# Patient Record
(demographics unavailable — no encounter records)

---

## 2025-05-07 NOTE — PLAN
[FreeTextEntry1] : Anxiety - well controlled on zoloft 50 mg QD. Renewal sent to pharmacy - advised to follow up in office for CPE and routine labs

## 2025-05-07 NOTE — HISTORY OF PRESENT ILLNESS
[Other Location: e.g. Home (Enter Location, City,State)___] : at [unfilled] [Telehealth (audio & video)] : This visit was provided via telehealth using real-time 2-way audio visual technology. [Verbal consent obtained from patient] : the patient, [unfilled] [Other Location: e.g. School (Enter Location, City,State)___] : at [unfilled], at the time of the visit. [FreeTextEntry1] : follow up [de-identified] : 45 yo F pmh crohn's disease on Remicade presents for follow up Was started on zoloft 25 mg QD about 1 year prior for anxiety. She increased to 50 mg about 3 months prior.  Patient states she feels well on zoloft. Notes significant improvement in anxiety. Denies symptoms of depression. Occasionally sees therapist. Denies all other complaints. She lives at home with 2 children and boyfriend. Currently working as guidance counselor at [employer]